# Patient Record
Sex: FEMALE | Race: WHITE | NOT HISPANIC OR LATINO | URBAN - NONMETROPOLITAN AREA
[De-identification: names, ages, dates, MRNs, and addresses within clinical notes are randomized per-mention and may not be internally consistent; named-entity substitution may affect disease eponyms.]

---

## 2018-07-02 ENCOUNTER — TRANSCRIBE ORDERS (OUTPATIENT)
Dept: ADMINISTRATIVE | Facility: HOSPITAL | Age: 58
End: 2018-07-02

## 2018-07-02 DIAGNOSIS — F48.9 DEFERRED DIAGNOSIS ON AXIS I: Primary | ICD-10-CM

## 2018-07-09 ENCOUNTER — HOSPITAL ENCOUNTER (OUTPATIENT)
Dept: MRI IMAGING | Facility: HOSPITAL | Age: 58
Discharge: HOME | End: 2018-07-09
Admitting: NURSE PRACTITIONER

## 2018-07-09 DIAGNOSIS — F48.9 DEFERRED DIAGNOSIS ON AXIS I: ICD-10-CM

## 2018-07-09 PROCEDURE — 72141 MRI NECK SPINE W/O DYE: CPT

## 2018-07-09 PROCEDURE — 72141 MRI NECK SPINE W/O DYE: CPT | Performed by: RADIOLOGY

## 2018-11-14 ENCOUNTER — TRANSCRIBE ORDERS (OUTPATIENT)
Dept: ADMINISTRATIVE | Facility: HOSPITAL | Age: 58
End: 2018-11-14

## 2018-11-14 DIAGNOSIS — G44.89 HEADACHE SYNDROME: Primary | ICD-10-CM

## 2018-11-16 ENCOUNTER — HOSPITAL ENCOUNTER (OUTPATIENT)
Dept: CT IMAGING | Facility: HOSPITAL | Age: 58
Discharge: HOME | End: 2018-11-16
Admitting: NURSE PRACTITIONER

## 2018-11-16 DIAGNOSIS — G44.89 HEADACHE SYNDROME: ICD-10-CM

## 2018-11-16 PROCEDURE — 70450 CT HEAD/BRAIN W/O DYE: CPT | Performed by: RADIOLOGY

## 2018-11-16 PROCEDURE — 70450 CT HEAD/BRAIN W/O DYE: CPT

## 2019-04-16 ENCOUNTER — OFFICE VISIT (OUTPATIENT)
Dept: UROLOGY | Facility: CLINIC | Age: 59
End: 2019-04-16

## 2019-04-16 VITALS — HEIGHT: 68 IN | WEIGHT: 188 LBS | BODY MASS INDEX: 28.49 KG/M2

## 2019-04-16 DIAGNOSIS — N20.0 RENAL CALCULUS: Primary | ICD-10-CM

## 2019-04-16 PROCEDURE — 99214 OFFICE O/P EST MOD 30 MIN: CPT | Performed by: UROLOGY

## 2019-04-16 RX ORDER — GABAPENTIN 300 MG/1
300 CAPSULE ORAL 3 TIMES DAILY
COMMUNITY

## 2019-04-16 RX ORDER — LISINOPRIL 10 MG/1
10 TABLET ORAL DAILY
COMMUNITY

## 2019-04-16 RX ORDER — SIMVASTATIN 20 MG
20 TABLET ORAL NIGHTLY
COMMUNITY

## 2019-04-16 RX ORDER — LEVOTHYROXINE SODIUM 112 UG/1
112 TABLET ORAL DAILY
COMMUNITY

## 2019-04-16 NOTE — PROGRESS NOTES
Chief Complaint:          Chief Complaint   Patient presents with   • Blood in Urine   • Flank Pain       HPI:   58 y.o. female.  58-year-old white female who had her first stone years ago treated with laser in [place] by Dr. Yoder.  She is had no pain.  She saw Dr. Huggins who recommended a repeat CAT scan because of microscopic hematuria and was found to have a 7 mm stone in the renal pelvis causing no hydronephrosis, no pain, no other significant symptomatology.  Apparently she saw Dr. Díaz for the same problem and was told to watch it.  Apparently, it has not changed in size.  She is a diabetic with other multiple problems.  She is had a hysterectomy, tubal ligation, cataracts a cholecystectomy and an appendectomy she is  1 para 1 with a negative physical examination I reviewed the CT.  I reviewed this with her.  I am recommending observation she has absolutely no complaints or problems referable to this asymptomatic stone and since is not impacting her renal function observation is indicated.  We discussed with her the indications for intervention including exacerbation of renal function, pain, nausea vomiting or hydronephrosis.    Past Medical History:        Past Medical History:   Diagnosis Date   • Diabetes (CMS/HCC)    • High cholesterol    • Hypertension    • Menetrier disease    • Thyroid disease          Current Meds:     Current Outpatient Medications   Medication Sig Dispense Refill   • gabapentin (NEURONTIN) 300 MG capsule Take 300 mg by mouth 3 (Three) Times a Day.     • insulin aspart (novoLOG) 100 UNIT/ML injection Inject  under the skin into the appropriate area as directed 3 (Three) Times a Day Before Meals.     • Insulin Glargine-Lixisenatide (SOLIQUA) 100-33 UNT-MCG/ML solution pen-injector Inject  under the skin into the appropriate area as directed.     • levothyroxine (SYNTHROID, LEVOTHROID) 112 MCG tablet Take 112 mcg by mouth Daily.     • lisinopril (PRINIVIL,ZESTRIL) 10 MG  tablet Take 10 mg by mouth Daily.     • Omega-3 Fatty Acids (OMEGA 3 500 PO) Take  by mouth.     • simvastatin (ZOCOR) 20 MG tablet Take 20 mg by mouth Every Night.       No current facility-administered medications for this visit.         Allergies:      No Known Allergies     Past Surgical History:     Past Surgical History:   Procedure Laterality Date   • APPENDECTOMY     • CATARACT EXTRACTION     • CHOLECYSTECTOMY     • HYSTERECTOMY     • TUBAL ABDOMINAL LIGATION           Social History:     Social History     Socioeconomic History   • Marital status:      Spouse name: Not on file   • Number of children: Not on file   • Years of education: Not on file   • Highest education level: Not on file   Tobacco Use   • Smoking status: Current Every Day Smoker     Packs/day: 0.25   • Smokeless tobacco: Never Used   Substance and Sexual Activity   • Alcohol use: No     Frequency: Never   • Drug use: No   • Sexual activity: Yes       Family History:     Family History   Problem Relation Age of Onset   • Diabetes Father    • Thyroid disease Father    • Diabetes Mother    • Kidney disease Mother    • Thyroid disease Mother        Review of Systems:     Review of Systems   Constitutional: Negative.  Negative for activity change, appetite change, chills, diaphoresis, fatigue and unexpected weight change.   HENT: Negative for congestion, dental problem, drooling, ear discharge, ear pain, facial swelling, hearing loss, mouth sores, nosebleeds, postnasal drip, rhinorrhea, sinus pressure, sneezing, sore throat, tinnitus, trouble swallowing and voice change.    Eyes: Negative.  Negative for photophobia, pain, discharge, redness, itching and visual disturbance.   Respiratory: Negative.  Negative for apnea, cough, choking, chest tightness, shortness of breath, wheezing and stridor.    Cardiovascular: Negative.  Negative for chest pain, palpitations and leg swelling.   Gastrointestinal: Negative.  Negative for abdominal  distention, abdominal pain, anal bleeding, blood in stool, constipation, diarrhea, nausea, rectal pain and vomiting.   Endocrine: Negative.  Negative for cold intolerance, heat intolerance, polydipsia, polyphagia and polyuria.   Musculoskeletal: Negative.  Negative for arthralgias, back pain, gait problem, joint swelling, myalgias, neck pain and neck stiffness.   Skin: Negative.  Negative for color change, pallor, rash and wound.   Allergic/Immunologic: Negative.  Negative for environmental allergies, food allergies and immunocompromised state.   Neurological: Negative.  Negative for dizziness, tremors, seizures, syncope, facial asymmetry, speech difficulty, weakness, light-headedness, numbness and headaches.   Hematological: Negative.  Negative for adenopathy. Does not bruise/bleed easily.   Psychiatric/Behavioral: Negative for agitation, behavioral problems, confusion, decreased concentration, dysphoric mood, hallucinations, self-injury, sleep disturbance and suicidal ideas. The patient is not nervous/anxious and is not hyperactive.    All other systems reviewed and are negative.      Physical Exam:     Physical Exam   Constitutional: She appears well-developed and well-nourished.   HENT:   Head: Normocephalic and atraumatic.   Right Ear: External ear normal.   Left Ear: External ear normal.   Mouth/Throat: Oropharynx is clear and moist.   Eyes: Conjunctivae are normal. Pupils are equal, round, and reactive to light.   Cardiovascular: Normal rate, regular rhythm, normal heart sounds and intact distal pulses.   Pulmonary/Chest: Effort normal and breath sounds normal.   Abdominal: Soft. Bowel sounds are normal. She exhibits no distension and no mass. There is no tenderness. There is no rebound and no guarding.   Genitourinary: No vaginal discharge found.   Musculoskeletal: Normal range of motion.   Neurological: She is alert. She has normal reflexes.   Skin: Skin is warm and dry.   Psychiatric: She has a normal mood  and affect. Her behavior is normal. Judgment and thought content normal.       I have reviewed the following portions of the patient's history: allergies, current medications, past family history, past medical history, past social history, past surgical history, problem list and ROS and confirm it's accurate.      Procedure:       Assessment/Plan:   Renal calculus-we discussed the presence of the stone we discussed the various therapeutic options available including percutaneous nephrostolithotomy, lithotripsy.  We discussed the risks of lithotripsy including the passage of stones the development of a large string of stones in the distal ureter known as Steinstrasse.  In the 3% incidence of that we will need to proceed with a ureteroscopy for obstructing fragments.  Extremely rare incidence of renal hematoma.  And the significance of this.  We discussed the absolute relative indicators for intervention including the presence of sepsis, and pain we cannot control is the primary need for urgent intervention.  We discussed placement of a stent if indicated and the management of the stent as well.  Her film is unchanged she has no absolute indicators for intervention.  I recommended observation, increased fluid intake etc. as long as it is asymptomatic she is unlikely to ever have problems from it.    Patient's Body mass index is 28.59 kg/m². BMI is above normal parameters. Recommendations include: educational material.          This document has been electronically signed by HARRISON FRANCIS MD April 16, 2019 9:48 AM

## 2019-04-17 PROBLEM — N20.0 RENAL CALCULUS: Status: ACTIVE | Noted: 2019-04-17

## 2019-05-21 ENCOUNTER — OFFICE VISIT (OUTPATIENT)
Dept: NEUROLOGY | Facility: CLINIC | Age: 59
End: 2019-05-21

## 2019-05-21 VITALS
HEIGHT: 68 IN | SYSTOLIC BLOOD PRESSURE: 126 MMHG | WEIGHT: 174 LBS | BODY MASS INDEX: 26.37 KG/M2 | DIASTOLIC BLOOD PRESSURE: 82 MMHG

## 2019-05-21 DIAGNOSIS — H81.10 BENIGN PAROXYSMAL POSITIONAL VERTIGO, UNSPECIFIED LATERALITY: Primary | ICD-10-CM

## 2019-05-21 PROCEDURE — 99205 OFFICE O/P NEW HI 60 MIN: CPT | Performed by: PSYCHIATRY & NEUROLOGY

## 2019-05-21 RX ORDER — AZELASTINE 1 MG/ML
SPRAY, METERED NASAL DAILY
COMMUNITY
Start: 2014-07-23

## 2019-05-21 NOTE — PROGRESS NOTES
Subjective:    CC: Roxy Zelaya is seen today in consultation at the request of MICHAEL Craig for Dizziness       HPI:  58-year-old female accompanied by her daughter with a history of hypothyroidism, diabetes type 2, Ménière's disease, hyperlipidemia presents with dizziness.  As per patient she started having episodes of dizziness in October.  She had her first episode after a chiropractic maneuver.  She became sick to her stomach and was taken to the hospital where it was thought that she had a bowel obstruction but was later found to have a hernia.  Since then she has been getting bouts of the room spinning or a feeling of not being steady especially with head turning.  She has also had episodes while lying down in the middle of the night.  These episodes can last for 5 minutes to a few hours.  Her last such episode was about 3 weeks ago.  She had an MRI brain recently that I personally reviewed today that did not show any acute intracranial abnormalities but did show fluid in the bilateral mastoid air cells.  She was treated for this with a 3-day course of IV antibiotics we did make the symptoms better.  She was also prescribed meclizine which does not help much.    The following portions of the patient's history were reviewed today and updated as of 05/21/2019  : allergies, current medications, past family history, past medical history, past social history, past surgical history and problem list  These document will be scanned to patient's chart.      Current Outpatient Medications:   •  azelastine (ASTELIN) 0.1 % nasal spray, into the nostril(s) as directed by provider Daily., Disp: , Rfl:   •  gabapentin (NEURONTIN) 300 MG capsule, Take 300 mg by mouth 3 (Three) Times a Day., Disp: , Rfl:   •  insulin aspart (novoLOG) 100 UNIT/ML injection, Inject  under the skin into the appropriate area as directed 3 (Three) Times a Day Before Meals., Disp: , Rfl:   •  Insulin Glargine-Lixisenatide (SOLIQUA) 100-33  UNT-MCG/ML solution pen-injector, Inject  under the skin into the appropriate area as directed., Disp: , Rfl:   •  levothyroxine (SYNTHROID, LEVOTHROID) 112 MCG tablet, Take 112 mcg by mouth Daily., Disp: , Rfl:   •  lisinopril (PRINIVIL,ZESTRIL) 10 MG tablet, Take 10 mg by mouth Daily., Disp: , Rfl:   •  Omega-3 Fatty Acids (OMEGA 3 500 PO), Take  by mouth., Disp: , Rfl:   •  simvastatin (ZOCOR) 20 MG tablet, Take 20 mg by mouth Every Night., Disp: , Rfl:    Past Medical History:   Diagnosis Date   • Diabetes (CMS/HCC)    • High cholesterol    • Hypertension    • Menetrier disease    • Thyroid disease       Past Surgical History:   Procedure Laterality Date   • APPENDECTOMY     • CATARACT EXTRACTION     • CHOLECYSTECTOMY     • HYSTERECTOMY     • TUBAL ABDOMINAL LIGATION        Family History   Problem Relation Age of Onset   • Diabetes Father    • Thyroid disease Father    • Diabetes Mother    • Kidney disease Mother    • Thyroid disease Mother       Social History     Socioeconomic History   • Marital status:      Spouse name: Not on file   • Number of children: Not on file   • Years of education: Not on file   • Highest education level: Not on file   Tobacco Use   • Smoking status: Current Every Day Smoker     Packs/day: 0.25   • Smokeless tobacco: Never Used   Substance and Sexual Activity   • Alcohol use: No     Frequency: Never   • Drug use: No   • Sexual activity: Yes     Review of Systems   Neurological: Positive for dizziness.       Objective:    As noted in the chart    Neurology Exam:    General apperance: NAD.  Damaso-Hallpike test revealed very slight nystagmus to the right    Mental status: Alert, awake and oriented to time place and person.    Recent and Remote memory: Intact.    Attention span and Concentration: Normal.     Language and Speech: Intact- No dysarthria.    Fluency, Naming , Repitition and Comprehension:  Intact    Cranial Nerves:   CN II: Visual fields are full. Intact. Fundi -  Normal, No papillederma, Pupils - OMERO  CN III, IV and VI: Extraocular movements are intact. Normal saccades.   CN V: Facial sensation is intact.   CN VII: Muscles of facial expression reveal no asymmetry. Intact.   CN VIII: Hearing is intact. Whispered voice intact.   CN IX and X: Palate elevates symmetrically. Intact  CN XI: Shoulder shrug is intact.   CN XII: Tongue is midline without evidence of atrophy or fasciculation.     Ophthalmoscopic exam of optic disc-normal    Motor:  Right UE muscle strength 5/5. Normal tone.     Left UE muscle strength 5/5. Normal tone.      Right LE muscle strength5/5. Normal tone.     Left LE muscle strength 5/5. Normal tone.      Sensory: Normal light touch, vibration and pinprick sensation bilaterally.    DTRs: 2+ bilaterally in upper and lower extremities.    Babinski: Negative bilaterally.    Co-ordination: Normal finger-to-nose, heel to shin B/L.    Rhomberg: Negative.    Gait: Normal.    Cardiovascular: Regular rate and rhythm without murmur, gallop or rub.    Assessment and Plan:  1. Benign paroxysmal positional vertigo, unspecified laterality  Patient is having bouts of positional vertigo which could be due to BPPV versus labyrinthitis.  She has already been treated with IV antibiotics and meclizine but continues to have these episodes  I will send her for vestibular therapy    - Ambulatory Referral to Physical Therapy Vestibular       Return in about 3 months (around 8/21/2019).     I spent over 60 minutes with the patient face to face out of which over 50% (30 minutes) was spent in management reviewing her MRI, instructions and education regarding her testing and these episodes.     Iva Reyes MD

## 2019-05-28 ENCOUNTER — HOSPITAL ENCOUNTER (OUTPATIENT)
Dept: PHYSICAL THERAPY | Facility: HOSPITAL | Age: 59
Setting detail: THERAPIES SERIES
Discharge: HOME | End: 2019-05-28

## 2019-05-28 DIAGNOSIS — H81.10 BENIGN PAROXYSMAL POSITIONAL VERTIGO, UNSPECIFIED LATERALITY: Primary | ICD-10-CM

## 2019-05-28 PROCEDURE — 97163 PT EVAL HIGH COMPLEX 45 MIN: CPT

## 2019-05-28 NOTE — THERAPY EVALUATION
Outpatient Physical Therapy Vestibular Initial Evaluation   [place]     Patient Name: Roxy Zelaya  : 1960  MRN: [id number]  Today's Date: 2019      Visit Date: 2019    Patient Active Problem List   Diagnosis   • Renal calculus   • BPPV (benign paroxysmal positional vertigo)        Past Medical History:   Diagnosis Date   • Diabetes (CMS/HCC)    • High cholesterol    • Hypertension    • Menetrier disease    • Thyroid disease         Past Surgical History:   Procedure Laterality Date   • APPENDECTOMY     • CATARACT EXTRACTION     • CHOLECYSTECTOMY     • HYSTERECTOMY     • TUBAL ABDOMINAL LIGATION           Visit Dx:     ICD-10-CM ICD-9-CM   1. Benign paroxysmal positional vertigo, unspecified laterality H81.10 386.11       Patient History     Row Name 19 0800             History    Chief Complaint  Dizziness  -RT      Date Current Problem(s) Began  -- 7 months  -RT      Brief Description of Current Complaint  Around seven months ago the patient was treated by a chiropractor and received a cervical manipulation and following this, she began to experience increased episodes of dizziness.  The patient continued to have episodes of dizziness and was referred to MD Reyes following an MRI of the brain.  The patient has now been evaluated by a an ENT and a neurologist for her current symptoms.  Her neurologist has suggested for her to be treated for BPPV.  -RT      Patient/Caregiver Goals  Know what to do to help the symptoms  -RT      Current Tobacco Use  yes  -RT      Smoking Status  .5 pack per day  -RT      Patient's Rating of General Health  Good  -RT      Hand Dominance  right-handed  -RT      Patient seeing anyone else for problem(s)?  no  -RT      How has patient tried to help current problem?  rest  -RT      What clinical tests have you had for this problem?  MRI  -RT         Pain     Pain Location  -- denies  -RT         Fall Risk Assessment    Any falls in the past year:  No  -RT          Daily Activities    Primary Language  English  -RT      Are you able to read  Yes  -RT      Are you able to write  Yes  -RT      How does patient learn best?  Listening;Reading;Demonstration;Pictures/Video  -RT         Safety    Are you being hurt, hit, or frightened by anyone at home or in your life?  No  -RT      Are you being neglected by a caregiver  No  -RT        User Key  (r) = Recorded By, (t) = Taken By, (c) = Cosigned By    Initials Name Provider Type    RT Cecilio Pickett, PT Physical Therapist          Vestibular Eval     Row Name 05/28/19 0800             Positional Testing    Vertebrobasilar Artery Screen - Right  Negative  -RT      Vertebrobasilar Artery Screen - Left  Negative  -RT      Damaso-Hallpike Right  No nystagmus mild dizziness  -RT      Mendham-Hallpike Right Duration Time   5 seconds  -RT      Mendham-Hallpike Left  -- minimal nystagmus and mild dizziness  -RT      Damaso-Hallpike Left Duration Time   5 seconds  -RT        User Key  (r) = Recorded By, (t) = Taken By, (c) = Cosigned By    Initials Name Provider Type    RT Cecilio Pickett, PT Physical Therapist        PT Ortho     Row Name 05/28/19 0800       Posture/Observations    Posture/Observations Comments  mild fwd head posture  -RT       Sensory Screen for Light Touch- Upper Quarter Clearing    C4 (posterior shoulder)  Bilateral:;Intact  -RT    C5 (lateral upper arm)  Bilateral:;Intact  -RT    C6 (tip of thumb)  Bilateral:;Intact  -RT    C7 (tip of 3rd finger)  Bilateral:;Intact  -RT    C8 (tip of 5th finger)  Bilateral:;Intact  -RT    T1 (medial lower arm)  Bilateral:;Intact  -RT       Myotomal Screen- Upper Quarter Clearing    Shoulder flexion (C5)  Bilateral:;5 (Normal)  -RT    Elbow flexion/wrist extension (C6)  Bilateral:;5 (Normal)  -RT    Elbow extension/wrist flexion (C7)  Bilateral:;5 (Normal)  -RT       Cervical/Shoulder ROM Screen    Cervical flexion  Normal  -RT    Cervical extension  Normal  -RT    Cervical lateral flexion   Normal  -RT    Cervical rotation  Normal  -RT       DTR- Lower Quarter Clearing    Patellar tendon (L2-4)  Bilateral:;1- Minimal response  -RT       Sensory Screen for Light Touch- Lower Quarter Clearing    L1 (inguinal area)  Bilateral:;Intact  -RT    L2 (anterior mid thigh)  Bilateral:;Intact  -RT    L3 (distal anterior thigh)  Bilateral:;Intact  -RT    L4 (medial lower leg/foot)  Bilateral:;Intact  -RT    L5 (lateral lower leg/great toe)  Bilateral:;Intact  -RT    S1 (bottom of foot)  Bilateral:;Intact  -RT       Myotomal Screen- Lower Quarter Clearing    Hip flexion (L2)  Bilateral:;5 (Normal)  -RT    Knee extension (L3)  Bilateral:;5 (Normal)  -RT    Ankle DF (L4)  Bilateral:;5 (Normal)  -RT    Knee flexion (S2)  Bilateral:;5 (Normal)  -RT       Gait/Stairs Assessment/Training    Comment (Gait/Stairs)  wnl  -RT      User Key  (r) = Recorded By, (t) = Taken By, (c) = Cosigned By    Initials Name Provider Type    RT Cecilio Pickett, PT Physical Therapist          PT Neuro     Row Name 05/28/19 0800             Vision-Basic Assessment    Current Vision  No visual deficits good tracking with no dizziness  -RT         Coordination    Rapid Alternating  Bilteral:;Intact  -RT      Finger to Nose Eyes Open  Bilteral:;Intact  -RT      Finger to Nose Eyes Closed  Bilteral:;Intact  -RT         Balance Skills Training    SLS  3-5 seconds  -RT      Sharpened Rhomberg  3-5 seconds  -RT        User Key  (r) = Recorded By, (t) = Taken By, (c) = Cosigned By    Initials Name Provider Type    RT Cecilio Pickett, PT Physical Therapist                Therapy Education  Given: Symptoms/condition management  Program: New  How Provided: Verbal, Demonstration  Provided to: Patient  Level of Understanding: Teach back education performed, Verbalized, Demonstrated                      PT OP Goals     Row Name 05/28/19 1100          PT Short Term Goals    STG Date to Achieve  06/11/19  -RT     STG 1  Pt will be instructed in self  treatment of BPPV.  -RT     STG 1 Progress  New  -RT     STG 2  Pt will be instructed in habitual exercises if needed.  -RT     STG 2 Progress  New  -RT     STG 3  Pt will report a 75% reduction in dizziness.  -RT     STG 3 Progress  New  -RT        Time Calculation    PT Goal Re-Cert Due Date  06/25/19  -RT       User Key  (r) = Recorded By, (t) = Taken By, (c) = Cosigned By    Initials Name Provider Type    RT Cecilio Pickett, PT Physical Therapist          PT Assessment/Plan     Row Name 05/28/19 1111          PT Assessment    Functional Limitations  Performance in leisure activities;Limitation in home management  -RT     Impairments  Balance;Other (comment) dizziness  -RT     Assessment Comments  Pt is a 59 y/o female referred to therapy for treatment of BPPV.  Pt presents with a normal musculoskeletal and neurological screen.  Pt demonstrated good balance and /70.  Pt demonstrated a negative vertebrobasilar arterty screen. Damaso garg [place] procedure for left and right produced only minimal dizziness with a minimal nystagmus on left.  Eppley maneuver performed on left with education and therapy will follow up with patient in 2-3 days.  Pt instructed to contact therapy as needed.  -RT     Please refer to paper survey for additional self-reported information  Yes  -RT     Rehab Potential  Good  -RT     Patient/caregiver participated in establishment of treatment plan and goals  Yes  -RT     Patient would benefit from skilled therapy intervention  Yes  -RT        PT Plan    PT Frequency  1x/week;2x/week  -RT     Predicted Duration of Therapy Intervention (Therapy Eval)  2 weeks  -RT     Planned CPT's?  PT EVAL HIGH COMPLEXITY: 77193;PT RE-EVAL: 04655;PT THER PROC EA 15 MIN: 62535;PT THER ACT EA 15 MIN: 46811;PT NEUROMUSC RE-EDUCATION EA 15 MIN: 64891;PT MANUAL THERAPY EA 15 MIN: 11543;PT GAIT TRAINING EA 15 MIN: 15544  -RT     Physical Therapy Interventions (Optional Details)  balance training;gait  training;neuromuscular re-education;modalities;manual therapy techniques;joint mobilization;home exercise program;patient/family education;postural re-education;ROM (Range of Motion);strengthening;motor coordination training  -RT     PT Plan Comments  Will monitor response for left Eppley treatment and treat as indicated.  -RT       User Key  (r) = Recorded By, (t) = Taken By, (c) = Cosigned By    Initials Name Provider Type    RT Cecilio Pickett, PT Physical Therapist                     Time Calculation:   Start Time: 0800  Stop Time: 0900  Time Calculation (min): 60 min   Therapy Charges for Today     Code Description Service Date Service Provider Modifiers Qty    [id number] HC PT EVAL HIGH COMPLEXITY 4 5/28/2019 Cecilio Pickett, PT GP 1                    Cecilio Pickett PT  5/28/2019

## 2019-05-30 ENCOUNTER — HOSPITAL ENCOUNTER (OUTPATIENT)
Dept: PHYSICAL THERAPY | Facility: HOSPITAL | Age: 59
Setting detail: THERAPIES SERIES
Discharge: HOME | End: 2019-05-30

## 2019-05-30 DIAGNOSIS — H81.10 BENIGN PAROXYSMAL POSITIONAL VERTIGO, UNSPECIFIED LATERALITY: Primary | ICD-10-CM

## 2019-05-30 NOTE — THERAPY DISCHARGE NOTE
Outpatient Physical Therapy Ortho Treatment Note/Discharge Summary  YASIR Negrito     Patient Name: Roxy Zelaya  : 1960  MRN: [id number]  Today's Date: 2019      Visit Date: 2019    Visit Dx:    ICD-10-CM ICD-9-CM   1. Benign paroxysmal positional vertigo, unspecified laterality H81.10 386.11       Patient Active Problem List   Diagnosis   • Renal calculus   • BPPV (benign paroxysmal positional vertigo)        Past Medical History:   Diagnosis Date   • Diabetes (CMS/HCC)    • High cholesterol    • Hypertension    • Menetrier disease    • Thyroid disease         Past Surgical History:   Procedure Laterality Date   • APPENDECTOMY     • CATARACT EXTRACTION     • CHOLECYSTECTOMY     • HYSTERECTOMY     • TUBAL ABDOMINAL LIGATION         PT Ortho     Row Name 19 0800       Posture/Observations    Posture/Observations Comments  mild fwd head posture  -RT       Sensory Screen for Light Touch- Upper Quarter Clearing    C4 (posterior shoulder)  Bilateral:;Intact  -RT    C5 (lateral upper arm)  Bilateral:;Intact  -RT    C6 (tip of thumb)  Bilateral:;Intact  -RT    C7 (tip of 3rd finger)  Bilateral:;Intact  -RT    C8 (tip of 5th finger)  Bilateral:;Intact  -RT    T1 (medial lower arm)  Bilateral:;Intact  -RT       Myotomal Screen- Upper Quarter Clearing    Shoulder flexion (C5)  Bilateral:;5 (Normal)  -RT    Elbow flexion/wrist extension (C6)  Bilateral:;5 (Normal)  -RT    Elbow extension/wrist flexion (C7)  Bilateral:;5 (Normal)  -RT       Cervical/Shoulder ROM Screen    Cervical flexion  Normal  -RT    Cervical extension  Normal  -RT    Cervical lateral flexion  Normal  -RT    Cervical rotation  Normal  -RT       DTR- Lower Quarter Clearing    Patellar tendon (L2-4)  Bilateral:;1- Minimal response  -RT       Sensory Screen for Light Touch- Lower Quarter Clearing    L1 (inguinal area)  Bilateral:;Intact  -RT    L2 (anterior mid thigh)  Bilateral:;Intact  -RT    L3 (distal anterior thigh)   Bilateral:;Intact  -RT    L4 (medial lower leg/foot)  Bilateral:;Intact  -RT    L5 (lateral lower leg/great toe)  Bilateral:;Intact  -RT    S1 (bottom of foot)  Bilateral:;Intact  -RT       Myotomal Screen- Lower Quarter Clearing    Hip flexion (L2)  Bilateral:;5 (Normal)  -RT    Knee extension (L3)  Bilateral:;5 (Normal)  -RT    Ankle DF (L4)  Bilateral:;5 (Normal)  -RT    Knee flexion (S2)  Bilateral:;5 (Normal)  -RT       Gait/Stairs Assessment/Training    Comment (Gait/Stairs)  wnl  -RT      User Key  (r) = Recorded By, (t) = Taken By, (c) = Cosigned By    Initials Name Provider Type    RT Cecilio Pickett, PT Physical Therapist                      PT Assessment/Plan     Row Name 05/30/19 1642          PT Assessment    Assessment Comments  [place] performed on left and right with no dizziness or nystagmus today.  Pt instructed in red flag symptoms and denies fever, chest pain, headaches, blurred vision or dizziness today.  Pt instructed on self performance of Eppley maneuver and advised to follow up with MD.  -RT        PT Plan    PT Plan Comments  See discharge.  -RT       User Key  (r) = Recorded By, (t) = Taken By, (c) = Cosigned By    Initials Name Provider Type    RT Cecilio Pickett, PT Physical Therapist              Exercises     Row Name 05/30/19 1600             Subjective Comments    Subjective Comments  Pt reports no change following last session.  Pt reports having an episode following last session with no known cause.    -RT        User Key  (r) = Recorded By, (t) = Taken By, (c) = Cosigned By    Initials Name Provider Type    RT Cecilio Pickett, PT Physical Therapist                             Therapy Education  Given: Symptoms/condition management  Program: Reinforced  How Provided: Verbal, Demonstration  Provided to: Patient  Level of Understanding: Teach back education performed, Verbalized, Demonstrated              Time Calculation:   Start Time: 1605  Stop Time: 1635  Time  Calculation (min): 30 min  Therapy Charges for Today     Code Description Service Date Service Provider Modifiers Qty    17414509288 HC PT CARE PLAN EACH 15 MIN 5/30/2019 Cecilio Pickett, PT GP 2                OP PT Discharge Summary  Date of Discharge: 05/30/19  Reason for Discharge: At baseline function  Outcomes Achieved: Other  Discharge Instructions/Additional Comments: Pt does not present with symptoms consistant with BPPV with a negative left and right [place] test today.  Pt unable to reproduce symptoms with head movements and reports symptoms have no certain catalyst.  Pt was advised to follow up with her MD and advised to contact therapy as needed.      Cecilio Pickett PT  5/30/2019

## 2019-07-05 ENCOUNTER — TRANSCRIBE ORDERS (OUTPATIENT)
Dept: ADMINISTRATIVE | Facility: HOSPITAL | Age: 59
End: 2019-07-05

## 2019-07-05 DIAGNOSIS — H81.4 VERTIGO OF CENTRAL ORIGIN, UNSPECIFIED LATERALITY: Primary | ICD-10-CM

## 2019-07-08 ENCOUNTER — HOSPITAL ENCOUNTER (OUTPATIENT)
Dept: MRI IMAGING | Facility: HOSPITAL | Age: 59
Discharge: HOME | End: 2019-07-08
Admitting: NURSE PRACTITIONER

## 2019-07-08 DIAGNOSIS — H81.4 VERTIGO OF CENTRAL ORIGIN, UNSPECIFIED LATERALITY: ICD-10-CM

## 2019-07-08 PROCEDURE — 70551 MRI BRAIN STEM W/O DYE: CPT | Performed by: RADIOLOGY

## 2019-07-08 PROCEDURE — 70551 MRI BRAIN STEM W/O DYE: CPT

## 2021-10-12 ENCOUNTER — TRANSCRIBE ORDERS (OUTPATIENT)
Dept: ADMINISTRATIVE | Facility: HOSPITAL | Age: 61
End: 2021-10-12

## 2021-10-12 DIAGNOSIS — Z01.818 OTHER SPECIFIED PRE-OPERATIVE EXAMINATION: Primary | ICD-10-CM

## 2022-03-09 ENCOUNTER — TRANSCRIBE ORDERS (OUTPATIENT)
Dept: ADMINISTRATIVE | Facility: HOSPITAL | Age: 62
End: 2022-03-09

## 2022-03-09 DIAGNOSIS — N21.8 CALCULUS OF OTHER LOWER URINARY TRACT LOCATION: Primary | ICD-10-CM

## 2022-03-16 ENCOUNTER — HOSPITAL ENCOUNTER (OUTPATIENT)
Dept: ULTRASOUND IMAGING | Facility: HOSPITAL | Age: 62
Discharge: HOME | End: 2022-03-16
Admitting: INTERNAL MEDICINE

## 2022-03-16 DIAGNOSIS — N21.8 CALCULUS OF OTHER LOWER URINARY TRACT LOCATION: ICD-10-CM

## 2022-03-16 PROCEDURE — 76775 US EXAM ABDO BACK WALL LIM: CPT | Performed by: RADIOLOGY

## 2022-03-16 PROCEDURE — 76775 US EXAM ABDO BACK WALL LIM: CPT

## 2022-06-27 ENCOUNTER — TRANSCRIBE ORDERS (OUTPATIENT)
Dept: ADMINISTRATIVE | Facility: HOSPITAL | Age: 62
End: 2022-06-27

## 2022-06-27 DIAGNOSIS — H81.01 MENIERE'S DISEASE OF RIGHT EAR: Primary | ICD-10-CM
